# Patient Record
(demographics unavailable — no encounter records)

---

## 2017-01-01 NOTE — ERD
ER Documentation


Chief Complaint


Chief Complaint


vomiting x 5 days





HPI


6-month-old boy was brought in by parents here in emergency department for 

vomiting.  Patient stated that he had vomited 3-4 times for the past 24 hours 

with nonbilious and nonbloody emesis.  Also complained of loose stool 1 today.

  Parents also stated that patient is mild cough that started a day ago.  

Mother stated that patient did not experience any head injury, changes in 

mentation, ear pulling, full term and birth via normal vaginal delivery without 

complications.  Neck stiffness, difficulty breathing when lying flat, 

difficulty breathing, loss of appetite, urinary symptoms, recent travel, recent 

exposure to any illness, recent antibiotic use the last 3 months, chills.  Up-to

-date in vaccinations.  Not exposed to secondhand smoking.





ROS


All systems reviewed and are negative except as per history of present illness.





Medications


Home Meds


Active Scripts


Ondansetron Hcl* (Ondansetron Hcl* Liq) 4 Mg/5 Ml Solution, 1.25 ML PO Q8 Y for 

NAUSEA AND/OR VOMITING, #2 OZ


   Prov:PASILABAN,COLLINAR F         12/17/17


Acetaminophen* (Acetaminophen* Susp) 160 Mg/5 Ml Oral.susp, 5 ML PO Q4H Y for 

PAIN OR FEVER, #1 BOTTLE


   Prov:PASILABAN,COLLINAR F         12/17/17


Electrolyte,Oral (Pedialyte) 1,000 Ml Solution, 100 ML PO Q6 Y for prevent 

dehydration, #1000 ML


   Prov:PASILABAN,KLAR F         12/17/17


Ibuprofen (MOTRIN LIQUID (PED)) 20 Mg/Ml Susp, 5 ML PO Q8H Y for PAIN AND OR 

ELEVATED TEMP, #4 OZ


   Prov:PASILABAN,KLAR F         12/17/17





Allergies


Allergies:  


Coded Allergies:  


     No Known Allergy (Unverified , 12/17/17)





Physical Exam


Vitals





Vital Signs








  Date Time  Temp Pulse Resp B/P Pulse Ox O2 Delivery O2 Flow Rate FiO2


 


12/17/17 21:51 99.0 127 20  98 Room Air  


 


12/17/17 16:49 100.9 140 26  98   








Physical Exam


Const: Well-appearing.  Age-appropriate.  Interacting.  Smiling.


Head:   Atraumatic 


Eyes:    Normal Conjunctiva.


ENT:    Normal External Ears, Nose and Mouth.


Neck:               Full range of motion..~ No meningismus.  No signs of 

meningeal irritation.


Resp:    Clear to auscultation bilaterally


Cardio:    Regular rate and rhythm, no murmurs


Abd:    Soft, non tender, non distended. Normal bowel sounds.  Bilateral lower 

extremities has good and full range of motion without facial grimacing or 

abdominal pain. 


Skin:    No petechiae or rashes.  No skin tenting.  No signs of dehydration.


Back:    No midline or flank tenderness


Ext:    No cyanosis, or edema


Neur:    Awake and alert.  Playful.  Smiling.


Psych:    Normal Mood and Affect


Results 24 hrs





 Current Medications








 Medications


  (Trade)  Dose


 Ordered  Sig/Agustina


 Route


 PRN Reason  Start Time


 Stop Time Status Last Admin


Dose Admin


 


 Ondansetron HCl


  (Zofran (Ped))  1 mg  ONCE  STAT


 PO


   12/17/17 19:20


 12/17/17 19:22 DC 12/17/17 19:37


 


 


 Ibuprofen


  (Motrin Liquid


  (Ped))  105 mg  ONCE  STAT


 PO


   12/17/17 19:20


 12/17/17 19:22 DC 12/17/17 19:38


 


 


 Acetaminophen


  (Tylenol Liquid


  (Ped))  155 mg  ONCE  STAT


 PO


   12/17/17 19:20


 12/17/17 19:22 DC 12/17/17 19:38


 











Procedures/MDM


Treatment: Motrin.  Tylenol.  Zofran.  P.o. challenge.





Reevaluation: No episode of emesis here in the emergency department.





Differential: I have low suspicion for meningitis, head injury, severe or 

serious bacterial infection, pneumonia, sepsis given to the patient is no neck 

stiffness, no changes in mentation,  no loss of appetite, lung sounds are clear 

to auscultation, no bilateral lower extremity without crying or abdominal pain, 

skin has no tenting, no signs of dehydration, capillary refills are less than 2 

seconds, well-appearing, smiling, age-appropriate, interacting.





Final diagnosis: Viral gastroenteritis.





Prescription: Pedialyte.  Motrin.  Tylenol.  Zofran.





Follow-up with pediatrician the next 3-4 days.  Come back here in the emergency 

department for any new symptoms or any worsening symptoms.  All questions and 

concerns are answered.  Parents verbalized understanding and agreed with the 

plan of care.





Hemodynamically stable on discharge.





Departure


Diagnosis:  


 Primary Impression:  


 Vomiting


 Additional Impression:  


 Viral gastroenteritis


Condition:  Stable





Additional Instructions:  


Follow-up with pediatrician the next 3-4 days.  Come back here in the emergency 

department for any new symptoms or any worsening symptoms.  All questions and 

concerns are answered.  Parents verbalized understanding and agreed with the 

plan of care.











DIALLO ALLEN Dec 17, 2017 19:29

## 2017-01-01 NOTE — ERD
ER Documentation


Chief Complaint


Chief Complaint


diarrhea  x 4 days





HPI


Patient is a 6-month-old male brought in by mother presents ED for concerns of 

diarrhea 4 days.  Mother states that patient has had yellow, watery, nonbloody 

stools for last 4 days.  Patient initially had vomiting 4 days ago which has 

now completely resolved.  Mother denies any additional episodes of vomiting 

since 2 days ago.  Patient has not had any fevers, chills, cough, rhinorrhea, 

ear tugging.  Patient is otherwise interactive and playful.  Patient is 

producing tears when crying.  Patient has normal urinary output.  Mother does 

report giving the patient Pedialyte approximately 1-2 times per day however she 

states she still giving him his formula.  Mother denies any recent antibiotic 

use.  Mother denies any recent travel.  Patient is up-to-date with vaccinations.





ROS


All systems reviewed and are negative except as per history of present illness.





Medications


Home Meds


Active Scripts


Electrolyte,Oral (Pedialyte) 1,000 Ml Solution, 100 ML PO Q6 Y for DIARRHEA, #1 

BOT


   Prov:REMY MONTGOMERY PA-C         12/22/17


Ondansetron Hcl* (Ondansetron Hcl* Liq) 4 Mg/5 Ml Solution, 1.25 ML PO Q8 Y for 

NAUSEA AND/OR VOMITING, #2 OZ


   Prov:PASILABANCOLLINAR F         12/17/17


Acetaminophen* (Acetaminophen* Susp) 160 Mg/5 Ml Oral.susp, 5 ML PO Q4H Y for 

PAIN OR FEVER, #1 BOTTLE


   Prov:PASILABANCOLLINAR F         12/17/17


Electrolyte,Oral (Pedialyte) 1,000 Ml Solution, 100 ML PO Q6 Y for prevent 

dehydration, #1000 ML


   Prov:PASILABAN,COLLINAR F         12/17/17


Ibuprofen (MOTRIN LIQUID (PED)) 20 Mg/Ml Susp, 5 ML PO Q8H Y for PAIN AND OR 

ELEVATED TEMP, #4 OZ


   Prov:PASILABAN,KLAR F         12/17/17





Allergies


Allergies:  


Coded Allergies:  


     No Known Allergy (Unverified , 12/17/17)





Physical Exam


Vitals





Vital Signs








  Date Time  Temp Pulse Resp B/P Pulse Ox O2 Delivery O2 Flow Rate FiO2


 


12/22/17 06:17 97.9 138 20  99   








Physical Exam


GENERAL: Well-developed, well-nourished male. Appears in no acute distress. 

Active and playful throughout exam. 


HEAD: Normocephalic, atraumatic. No deformities or ecchymosis noted.


EYES: Pupils are equally reactive bilaterally. EOMs grossly intact. No 

conjunctival erythema. 


ENT: External ear without any masses or tenderness. TM visualized bilaterally, 

non-erythematous, non-bulging. Nasal mucosa pink with no discharge. Oropharynx 

is pink without any tonsillar erythema or exudates. No uvula deviation. No 

kissing tonsils. 


NECK: Supple, no lymphadenopathy. No meningeal signs.  


Lungs: Clear to auscultation bilaterally. No rhonchi, wheezing, rales or coarse 

breath sounds. 


HEART: Regular rate and rhythm. No murmurs, rubs or gallops.


ABDOMEN: No scars, ecchymosis or rashes noted. Soft, nontender, nondistended. 

No rebound tenderness, no guarding. (-) McBurney's point tenderness. No CVA 

tenderness.  No peritoneal signs noted.


BACK: No midline tenderness. 


EXTREMITIES: Equal pulses bilaterally. No peripheral clubbing, cyanosis or 

edema. No unilateral leg swelling.


NEUROLOGIC: Alert. Interactive and playful throughout exam. Moving all four 

extremities.  No pallor.


SKIN: Normal color. Warm and dry. No rashes or lesions.





Procedures/MDM


MEDICAL DECISION MAKING:


This is a 6-month-old male who presents ED for concerns of diarrhea 4 days.  

Mother denies any recent travel.  Mother denied any recent antibiotic use.  

Patient does not have fevers.. Vital signs were reviewed. Patient was afebrile. 

Patient was not hypoxic.  Physical exam findings were unremarkable.  Patient 

had a soft abdomen, no guarding, no rebound.  No peritoneal signs were noted.  

Low suspicion for acute abdomen at this time.  Mother did report that patient 

is producing tears when crying and has normal urinary output.  Low suspicion 

for patient requiring IV rehydration therapy or inpatient admission at this 

time.  Given that patient initially presented with vomiting which now has 

progressed into diarrhea, my suspicion is that patient has a viral syndrome.  

Mother was advised to continue Pedialyte at home.  Low suspicion for pneumonia, 

strep pharyngitis, acute otitis media, UTI, meningitis, appendicitis, bowel 

obstruction, volvulus or intussusception.  Patient was advised to follow-up 

with his pediatrician for stool studies on an outpatient basis.  Mother was 

also advised to discuss the patient's loose stools with the patient's 

pediatrician and to consider a formula change.  Patient was interactive 

throughout the ED course and smiling.  Patient was nontoxic, non-ill-appearing 

prior to discharge. 





PRESCRIPTIONS:


Pedialyte





DISCHARGE: 


At this time, patient is stable for discharge and outpatient management. I have 

advised the patients parents to closely monitor their child over the next 24 

hours for any new or worsening symptoms including increased pain, nausea, 

vomiting, weakness, fever or LOC. I have instructed them to return to the ER in 

8 hours for a recheck. In addition, I have instructed the patient and family to 

follow-up with his/her primary care physician in 1-2 days. The patient and/or 

family expressed understanding of and agreement with this plan. All questions 

were answered. Home care instructions were provided. 





Disclaimer: Inadvertent spelling and grammatical errors are likely due to EHR/

dictation software use and do not reflect on the overall quality of patient 

care. Also, please note that the electronic time recorded on this note does not 

necessarily reflect the actual time of the patient encounter.





Departure


Diagnosis:  


 Primary Impression:  


 Diarrhea


 Diarrhea type:  unspecified type  Qualified Code:  R19.7 - Diarrhea, 

unspecified type


 Additional Impression:  


 Viral gastroenteritis


Condition:  Stable


Patient Instructions:  Diarrhea, Viral (Infant/Toddler)


Referrals:  


[facility]


YOU HAVE RECEIVED A MEDICAL SCREENING EXAM AND THE RESULTS INDICATE THAT YOU DO 

NOT HAVE A CONDITION THAT REQUIRES URGENT TREATMENT IN THE EMERGENCY DEPARTMENT.





FURTHER EVALUATION AND TREATMENT OF YOUR CONDITION CAN WAIT UNTIL YOU ARE SEEN 

IN YOUR DOCTORS OFFICE WITHIN THE NEXT 1-2 DAYS. IT IS YOUR RESPONSIBILITY TO 

MAKE AN APPOINTMENT FOR FOLOW-UP CARE.





IF YOU HAVE A PRIMARY DOCTOR


--you should call your primary doctor and schedule an appointment





IF YOU DO NOT HAVE A PRIMARY DOCTOR YOU CAN CALL OUR PHYSICIAN REFERRAL HOTLINE 

AT


 [phone] 





IF YOU CAN NOT AFFORD TO SEE A PHYSICIAN YOU CAN CHOSE FROM THE FOLLOWING 

[facility] CLINICS





[facility] [phone] [address] [facility]. [facility] [phone] [address] [facility]. [facility] [phone] [address] [facility] [phone] [address] [facility] [phone] [address] [facility]. [facility] [phone] [address] [facility]. [facility]


YOU HAVE RECEIVED A MEDICAL SCREENING EXAM AND THE RESULTS INDICATE THAT YOU DO 

NOT HAVE A CONDITION THAT REQUIRES URGENT TREATMENT IN THE EMERGENCY DEPARTMENT.





FURTHER EVALUATION AND TREATMENT OF YOUR CONDITION CAN WAIT UNTIL YOU ARE SEEN 

IN YOUR DOCTORS OFFICE WITHIN THE NEXT 1-2 DAYS. IT IS YOUR RESPONSIBILITY TO 

MAKE AN APPOINTMENT FOR FOLOW-UP CARE.





IF YOU HAVE A PRIMARY DOCTOR


--you should call your primary doctor and schedule and appointment





IF YOU DO NOT HAVE A PRIMARY DOCTOR YOU CAN CALL OUR PHYSICIAN REFERRAL HOTLINE 

AT [phone].





IF YOU CAN NOT AFFORD TO SEE A PHYSICIAN YOU CAN CHOSE FROM THE FOLLOWING 

[facility] INSTITUTIONS:





[facility]


[address] [place] [address]





[facility]


[address] [place] [address]





[facility]


[address] [place] [address]





Additional Instructions:  


Follow-up with your pediatrician for stool studies.  Discuss ongoing episodes 

of diarrhea with pediatrician.  May need to change formula if loose stools 

continue to occur.  May need to follow-up with pediatric GI specialist.





Call your primary care doctor TOMORROW for an appointment during the next 1-2 

days.See the doctor sooner or return here if your condition worsens before your 

appointment time.











REMY MONTGOMERY PA-C Dec 22, 2017 06:59